# Patient Record
Sex: FEMALE | NOT HISPANIC OR LATINO | Employment: OTHER | RURAL
[De-identification: names, ages, dates, MRNs, and addresses within clinical notes are randomized per-mention and may not be internally consistent; named-entity substitution may affect disease eponyms.]

---

## 2018-03-28 ENCOUNTER — OFFICE VISIT CONVERTED (OUTPATIENT)
Dept: PULMONOLOGY | Facility: CLINIC | Age: 73
End: 2018-03-28
Attending: PHYSICIAN ASSISTANT

## 2018-05-17 ENCOUNTER — OFFICE VISIT CONVERTED (OUTPATIENT)
Dept: PULMONOLOGY | Facility: CLINIC | Age: 73
End: 2018-05-17
Attending: INTERNAL MEDICINE

## 2018-11-20 ENCOUNTER — OFFICE VISIT CONVERTED (OUTPATIENT)
Dept: PULMONOLOGY | Facility: CLINIC | Age: 73
End: 2018-11-20
Attending: INTERNAL MEDICINE

## 2020-04-15 ENCOUNTER — OFFICE VISIT CONVERTED (OUTPATIENT)
Dept: PULMONOLOGY | Facility: CLINIC | Age: 75
End: 2020-04-15
Attending: INTERNAL MEDICINE

## 2020-07-17 ENCOUNTER — OFFICE VISIT CONVERTED (OUTPATIENT)
Dept: PULMONOLOGY | Facility: CLINIC | Age: 75
End: 2020-07-17
Attending: INTERNAL MEDICINE

## 2021-01-20 ENCOUNTER — OFFICE VISIT CONVERTED (OUTPATIENT)
Dept: PULMONOLOGY | Facility: CLINIC | Age: 76
End: 2021-01-20
Attending: INTERNAL MEDICINE

## 2021-05-28 VITALS
HEIGHT: 62 IN | DIASTOLIC BLOOD PRESSURE: 64 MMHG | RESPIRATION RATE: 20 BRPM | HEART RATE: 94 BPM | SYSTOLIC BLOOD PRESSURE: 106 MMHG | WEIGHT: 207.19 LBS | OXYGEN SATURATION: 90 % | TEMPERATURE: 97.3 F | BODY MASS INDEX: 38.13 KG/M2

## 2021-05-28 VITALS
OXYGEN SATURATION: 90 % | TEMPERATURE: 98.3 F | HEIGHT: 62 IN | DIASTOLIC BLOOD PRESSURE: 64 MMHG | SYSTOLIC BLOOD PRESSURE: 144 MMHG | BODY MASS INDEX: 42.23 KG/M2 | RESPIRATION RATE: 16 BRPM | WEIGHT: 229.5 LBS | HEART RATE: 80 BPM

## 2021-05-28 VITALS
HEIGHT: 62 IN | OXYGEN SATURATION: 91 % | OXYGEN SATURATION: 94 % | DIASTOLIC BLOOD PRESSURE: 62 MMHG | RESPIRATION RATE: 14 BRPM | HEART RATE: 84 BPM | BODY MASS INDEX: 37.18 KG/M2 | WEIGHT: 221 LBS | BODY MASS INDEX: 40.67 KG/M2 | TEMPERATURE: 98 F | TEMPERATURE: 98.2 F | DIASTOLIC BLOOD PRESSURE: 77 MMHG | SYSTOLIC BLOOD PRESSURE: 121 MMHG | RESPIRATION RATE: 12 BRPM | WEIGHT: 202.06 LBS | SYSTOLIC BLOOD PRESSURE: 157 MMHG | HEIGHT: 62 IN | HEART RATE: 83 BPM

## 2021-05-28 NOTE — PROGRESS NOTES
Patient: SHAHNAZ SHAIKH     Acct: [id number]     Report: #[id number]  UNIT #: [id number]     : 1945    Encounter Date:04/15/2020  PRIMARY CARE: ELENA PIERSON  ***Signed***  --------------------------------------------------------------------------------------------------------------------  TELEHEALTH NOTE      History of Present Illness            Chief Complaint: soa follow up            Shahnaz Shaikh is presenting for evaluation via Telehealth visit. Verbal consent     obtained before beginning visit.            Provider spent 22 minutes with the patient during telehealth visit.            The following staff were present during the visit: carlota rose MD and beth yin ma            The patient is a 74 year old  female with a history of pulmonary     embolism and COPD here for follow up.  She is on lifelong anticoagulation and     has been doing relatively well.  She lives in [place], lives 60 miles     away and has trouble getting here for follow up.  She has COPD with FEV1 of 50%     and is only on albuterol as needed 3-4 times per day.  She reports dyspnea on     exertion, worse with walking 200-300 feet, moderate in severity, worse with     exertion and relieved with rest.  She also has increasing dry hacking cough     throughout the day with no sputum production or hemoptysis.  Denies any nausea,     vomiting, fevers, chills, headaches or chest pain. She is able to perform her     ADLs without difficulty.  Denies any swollen glands or lymph nodes of the head     and neck.  Of note, she was recently hospitalized in 2020 for pulmonary     embolism and this was submassive.  Echocardiogram did show right heart strain     and she is due for a repeat echocardiogram.  She denies any recent leg swelling,    orthopnea or paroxysmal nocturnal dyspnea.  She is able to perform her ADLs     without difficulty.  Denies any swollen glands or lymph nodes of the head and     neck.             I have personally reviewed the review of systems, past family, social, surgical     and medical histories and I agree with the findings.              Physical exam deferred due to TeleHealth visit.              I reviewed my last office  note.  I personally reviewed hospital records from     01/2020 including an echocardiogram that I personally reviewed 01/2020.  I     personally reviewed chest x-ray and chest CT from 01/2020.  Labs from 01/2020     personally reviewed showing 680 peripheral eosinophils and no evidence of     chronic hypercapnic respiratory failure.                           Plan/Instructions      Ambulatory Assessment/Plan:        Pulmonary hypertension - I27.20            Notes      New Medications      * Tiotropium Br/Olodaterol HCl (Stiolto Respimat Inhal [place]) 4 GM MIST.INHAL: 2      PUFFS INH QDAY #1      Renewed Medications      * Albuterol/Ipratropium (Duoneb) 3 ML AMPUL.NEB: 3 ML INH RTQ8H 30 Days #90         Instructions: DIAGNOSIS CODE REQUIRED PRIOR TO PRESCRIBING.         Dx: Respiratory failure with hypoxia - J96.91      New Diagnostics      * Echo Complete, Week         Dx: Pulmonary hypertension - I27.20      Plan/Instructions            * Plan Of Care: ()            * Chronic conditions reviewed and taken into consideration for today's treatment       plan.      * Patient instructed to seek medical attention urgently for new or worsening       symptoms.      * Patient was educated/instructed on their diagnosis, treatment and medications       prior to discharge from the clinic today.            IMPRESSION:      1. Acute pulmonary embolism with cor pulmonale, status post TPA, now resolved     and at baseline, now on lifelong anticoagulation.      2. Pulmonary hypertension, question of secondary to chronic thromboembolic     pulmonary hypertension versus acute pulmonary hypertension related to cor     pulmonale.  Will need follow up echocardiogram to reassess.      3. COPD  without exacerbation, FEV1 50% with significant bronchodilator response.      She is using albuterol frequently, COPD assessment test score is 20 signifying     poor control of underlying disease. Would benefit from LAMA/LABA therapy.      4. Chronic hypoxemic respiratory failure.      5.  Known morbid obesity with BMI over 40.      6. Multiple lung nodules stable in size for a year and a half.               PLAN:      1.  Given patient had recurrent PEs and the last one required extensive     hospitalization at [facility] and required half dose TPA with     persistent profound hypoxemia and right heart failure, will recommend likely     anticoagulation with Eliquis and she will continue for now.      2. Repeat echocardiogram now to assess for improving pulmonary hypertension.  T    he patient's symptoms are back to baseline and will need to further evaluate for     possible CTEPH.  If there is continued pulmonary hypertension noted on     echocardiogram, she will need a VQ scan to evaulate for chronic thromboembolic     pulmonary hypertension.      3.  Repeat noncontrast chest CT in one year to assess stable pulmonary nodules.      4. Start Stiolto two puffs daily.  Inhaler education discussed over the phone     today. Continue albuterol as needed.      5. Continue home 2 liters of oxygen to keep SPO2 greater than 90%.      6. Encourage ambulation.      7.  Up-to-date with flu, Prevnar and Pneumovax.      8.  The patient is going to follow up with Dr. Saucedo in [place] as the     patient lives in [place] and she has trouble getting transportation here     and it is 60 miles away. She is agreeable to seeing Dr. Saucedo in     [place].  She can follow up with her in the next 2-3 months.      9.  I spent 22 minutes of time today in a TeleHealth visit with this patient,     discussing the case with the patient over the phone and personally reviewing all     pertinent labs, imaging and  provider notes.      Codes:  Phone Eval 21-30 mi [id number]            Electronically signed by JEROME HOANG  05/12/2020 12:34       Disclaimer: Converted document may not contain table formatting or lab diagrams. Please see "[employer] System for the authenticated document.

## 2021-05-28 NOTE — PROGRESS NOTES
Patient: ALLAN CHRISTIE     Acct: [id number]     Report: #[id number]  UNIT #: [id number]     : 1945    Encounter Date:2020  PRIMARY CARE: ELENA PIERSON  ***Signed***  --------------------------------------------------------------------------------------------------------------------  Chief Complaint      Encounter Date      2020            Primary Care Provider      ELENA PIERSON            Referring Provider      ELENA PIERSON            Patient Complaint      Patient is complaining of      follow up/soa            VITALS      Height 5 ft 2 in / 157.48 cm      Weight 229 lbs 8 oz / 104.508763 kg      BSA 2.03 m2      BMI 42.0 kg/m2      Temperature 98.3 F / 36.83 C - Oral      Pulse 80      Respirations 16      Blood Pressure 144/64 Sitting, Right Arm      Pulse Oximetry 90%, nasal cannula, 2 lpm            HPI      The patient is a 74 year old female with a history of massive pulmonary embolism    with right heart strain requiring TPA. She is on Lifelong anticoagulation with     Eliquis.  She also has COPD with an FEV1 of about 50% of predicted. She uses     albuterol 3-4 times per day as needed.  She followed up with me today because of    her location.  She lives in [place] and was having difficulty making the     long trips to see Dr. Mondragon.  She reports doing well. The swelling in her legs    has dramatically decreased over the past several months. She is wearing her     supplemental oxygen 24/, denies chest pain or pressure, denies worsening cough     since starting Stiolto and thinks it is working quite well.  She does report     being afraid to run out of oxygen because she does not have a portable oxygen     concentrator and she wishes that the insurance company would pay for it because     she cannot even get out and go to the store, perform any exercise or go to     doctors appointments further away from her house because of fearful of running     out of  oxygen.              Past medical, family, social and surgical history reviewed and I agree with that    as documented in the medical chart.            ROS      Constitutional:  Complains of: Fatigue; Denies: Fever, Weight gain, Weight loss,    Chills, Insomnia, Other      Respiratory/Breathing:  Complains of: Shortness of air, Wheezing, Cough; Denies:    Hemoptysis, Pleuritic pain, Other      Endocrine:  Denies: Polydipsia, Polyuria, Heat/cold intolerance, Abnorml     menstrual pattern, Diabetes, Other      Eyes:  Denies: Blurred vision, Vision Changes, Other      Ears, nose, mouth, throat:  Denies: Mouth lesions, Thrush, Throat pain,     Hoarseness, Allergies/Hay Fever, Post Nasal Drip, Headaches, Recent Head Injury,    Nose Bleeding, Neck Stiffness, Thyroid Mass, Hearing Loss, Ear Fullness, Dry     Mouth, Nasal or Sinus Pain, Dry Lips, Nasal discharge, Nasal congestion, Other      Cardiovascular:  Complains of: Leg Swelling, Dyspnea on Exertion; Denies:     Palpitations, Syncope, Claudication, Chest Pain, Wake up Gasping for air,     Irregular Heart Rate, Cyanosis, Other      Gastrointestinal:  Denies: Nausea, Constipation, Diarrhea, Abdominal pain,     Vomiting, Difficulty Swallowing, Reflux/Heartburn, Dysphagia, Jaundice,     Bloating, Melena, Bloody stools, Other      Genitourinary:  Denies: Urinary frequency, Incontinence, Hematuria, Urgency,     Nocturia, Dysuria, Other      Musculoskeletal:  Denies: Joint Pain, Joint Stiffness, Joint Swelling, Myalgias,    Other      Hematologic/lymphatic:  DENIES: Lymphadenopathy, Bruising, Bleeding tendencies,     Other      Neurological:  Denies: Headache, Numbness, Weakness, Seizures, Other      Psychiatric:  Denies: Anxiety, Appropriate Effect, Depression, Other      Sleep:  No: Excessive daytime sleep, Morning Headache?, Snoring, Insomnia?, Stop    breathing at sleep?, Other      Integumentary:  Denies: Rash, Dry skin, Skin Warm to Touch, Other       Immunologic/Allergic:  Denies: Latex allergy, Seasonal allergies, Asthma,     Urticaria, Eczema, Other      Immunization status:  Up to date            FAMILY/SOCIAL/MEDICAL HX      Surgical History:  No: AAA Repair, Abdominal Surgery, Adenoids, Angioplasty,     Appendectomy, Back Surgery, Bladder Surgery, Bowel Surgery, Breast Surgery,     CABG, Carotid Stenosis, Cholecystectomy, Ear Surgery, Eye Surgery, Head Surgery,    Hernia Surgery, Kidney Surgery, Nose Surgery, Oral Surgery, Orthopedic Surgery,     Prostatectomy, Rectal Surgery, Spinal Surgery, Testicular Surgery, Throat     Surgery, Tonsils, Valve Replacement, Vascular Surgery, Other Surgeries      Stroke - Family Hx:  Child      Heart - Family Hx:  Brother, Child      Is Father Still Living?:  No      Is Mother Still Living?:  No       Family History:  Yes      Social History:  No Tobacco Use, No Alcohol Use, No Recreational Drug use      Smoking status:  Former smoker (2.5 ppd x 50 years/ quit Jan 2003)      Anticoagulation Therapy:  Yes      Antibiotic Prophylaxis:  No      Medical History:  Yes: Asthma, Chronic Bronchitis/COPD, Congestive Heart Failu,     Diabetes (Type II), Hemorrhoids/Rectal Prob; No: Alcoholism, Allergies, Anemia,     Arthritis, Atrial Fibrillation, Blood Disease, Broken Bones, Cataracts, Chemical    Dependency, Chemotherapy/Cancer, Emphysema, Chronic Liver Disease, Colon     Trouble, Colitis, Diverticulitis, Deafness or Ringing Ears, Convulsions,     Depression, Anxiety, Bipolar Disorder, PTSD, Epilepsy, Seizures, Forgetfullness,    Glaucoma, Gall Stones, Gout, Head Injury, Heart Attack, Heart Murmur, GERD,     Hepatitis, Hiatal Hernia, High Blood Pressure, High Cholesterol, HIV (Do not ask    - volu, Jaundice, Kidney or Bladder Disease, Kidney Stones, Migrane Headaches,     Mitral Valve Prolapse, Night sweats, Phlebitis, Psychiatric Care, Reflux     Disease, Rheumatic Fever, Sexually Transmitted Dis, Shortness Of Breath, Sinus      Trouble, Skin Disease/Psoriais/Ecz, Stroke, Thyroid Problem, Tuberculosis or Pos    TB Te, Miscellaneous Medical/oth      Psychiatric History      none            PREVENTION      Hx Influenza Vaccination:  No      Influenza Vaccine Declined:  Yes      2 or More Falls in Past Year?:  No      Fall Past Year with Injury?:  No      Hx Pneumococcal Vaccination:  Yes      Encouraged to follow-up with:  PCP regarding preventative exams.      Chart initiated by      beth yin ma            ALLERGIES/MEDICATIONS      Allergies:        Coded Allergies:             NO KNOWN ALLERGIES (Unverified , 7/17/20)      Medications    Last Reconciled on 7/17/20 13:47 by CLAUDIA COULTER,       Albuterol/Ipratropium (Duoneb) 3 Ml Ampul.neb      3 ML INH RTQ8H for 30 Days, #90 NEB 3 Refills         Prov: JEROME HOANG         5/7/20       Tiotropium Br/Olodaterol HCl (Stiolto Respimat Inhal Spray) 4 Gm Mist.inhal      2 PUFFS INH QDAY, #1 INH 3 Refills         Prov: JEROEM HOANG         4/15/20       Apixaban (Eliquis) 5 Mg Tablet      5 MG PO BID for 30 Days, #75 TAB         Prov: Kirk Urias         1/16/20       Potassium Chloride (K-Dur*) 20 Meq Tab.er.prt      20 MEQ PO QDAY, #30 TAB 0 Refills         Reported         1/12/20       Ergocalciferol (Ergocalciferol) 50,000 Unit Capsule      74601 UNITS PO MO@09, #4 CAP 0 Refills         Reported         1/12/20       Gabapentin (Neurontin) 300 Mg Capsule      300 MG PO TID, #90 CAP 0 Refills         Reported         11/20/18       Insulin Detemir (Levemir VIAL*) 100 Unit/1 Ml Vial      6 UNITS SUBQ BID for 30 Days, VIAL         Prov: Napoleon Ariza         3/12/18       Famotidine (Famotidine) 20 Mg Tablet      20 MG PO BID for 30 Days, #60 TAB         Prov: Napoleon Ariza         3/12/18       Furosemide (Furosemide) 20 Mg Tablet      20 MG PO QDAY for 30 Days, #30 TAB         Prov: Napoleon Ariza         3/12/18      Current Medications      Current Medications Reviewed 7/17/20             EXAM      VITAL SIGNS:  Reviewed.        GENERAL: Morbid obese female on 2 liters of oxygen, in no acute distress.        NECK:  Supple without tracheal deviation or lymphadenopathy.  No thyromegaly     appreciated.      LYMPHATICS:  No cervical or supraclavicular lymphadenopathy.      HEENT: Increased neck diameter, no JVD appreciated.        RESPIRATORY:  Diminished breath sounds at the bases, but otherwise clear to     auscultation without wheezes, rales or rhonchi.  Tympanic to percussion.        CARDIOVASCULAR:  Distant heart tones secondary to body habitus, unable to feel     for PMI, no obvious murmurs or gallops, trace pedal edema bilaterally.        GI: Obese, soft, nontender, nondistended, no organomegaly.  Bowel sounds present    in all four quadrants.      MUSCULOSKELETAL:  No joint effusions, erythema or warmth over the major joint     systems.      SKIN:  No rashes or lesions.      NEUROLOGIC: Cranial nerves II-XII are intact bilaterally.  Moves all ext    remities. Ambulates with ease.      PSYCH:  Appropriate mood and affect.      Vtials      Vitals:             Height 5 ft 2 in / 157.48 cm           Weight 229 lbs 8 oz / 104.104777 kg           BSA 2.03 m2           BMI 42.0 kg/m2           Temperature 98.3 F / 36.83 C - Oral           Pulse 80           Respirations 16           Blood Pressure 144/64 Sitting, Right Arm           Pulse Oximetry 90%, nasal cannula, 2 lpm            REVIEW      Results Reviewed      PCCS Results Reviewed?:  Yes Prev Lab Results, Yes Prev Radiology Results, Yes     [facility]      Lab Results      I reviewed Dr. Mondragon last office visit note.            I reviewed her most recent echocardiogram. This was performed at [facility] showing normal right and level ventricles with normal EF, structurally     normal appearing valves without stenosis or regurgitation.  Right ventricular     systolic pressure was around 30.            Assessment       IMPRESSION:      1.  History of massive PE requiring TPA, on lifelong anticoagulation with     Eliquis.      2.  Chronic hypoxic respiratory failure on 2 liters of oxygen at all times.      3.  Morbid obesity with BMI of 42.      4. Moderately severe COPD without acute exacerbation.      5. History of a new ground glass nodule, unfortunately, imaging not available     for us to review from outside hospital.      6. History of multiple lung nodules, stable.              PLAN:      1.  I reviewed the EKG with the patient.  Given her massive PE, these pulmonary     pressures are acceptable.  I do not think she needs to be referred for any right    or left heart cath.  She does not need further workup at this time.      2. I do feel that the patient would benefit from having a portable oxygen     concentrator/[employer] machine. She is very limited in her mobility and ability to     stay active because of the heavy tanks, also they run out very quickly and [place] a two hour drive to     the nearest civilization. She cannot get out of her house because she does not     have a portable oxygen concentrator. Please provide this for her.      3. She is due for her noncontrast chest CT within the next six months. If it has    not been ordered by Dr. Mondragon, I will make sure it is ordered at her follow up    appointment.      4. Up-to-date with flu, Prevnar and Pneumovax.      5. Counseled the patient on weight loss, continuing to watch her calories as she    is morbidly obese.      6. Continue Stiolto.            Patient Education      ACO BMI High above 25:  Counseling Given, Encouraged weight loss      Education resources provided:  Yes      Other Education:  CHRONIC ELIQUIS THERAPY EDUCATION            Electronically signed by CLAUDIA COULTER  07/23/2020 09:07       Disclaimer: Converted document may not contain table formatting or lab diagrams. Please see [email or web address] for the authenticated document.

## 2021-05-28 NOTE — PROGRESS NOTES
Patient: ALLAN CHRISTIE     Acct: [id number]     Report: #[id number]  UNIT #: [id number]     : 1945    Encounter Date:2018  PRIMARY CARE: ELENA PIERSON  ***Signed***  --------------------------------------------------------------------------------------------------------------------  Chief Complaint      Encounter Date      Mar 28, 2018            Primary Care Provider      HARISH BENNETT            Patient Complaint      Patient is complaining of      [facility] D/C ...New patient here for respiratory failure            VITALS      Height 5 ft 2 in / 157.48 cm      Weight 207 lbs 3 oz / 93.161966 kg      BSA 2.08 m2      BMI 37.9 kg/m2      Temperature 97.3 F / 36.28 C - Oral      Pulse 94      Respirations 20      Blood Pressure 106/64 Sitting, Left Arm      Pulse Oximetry 90%, nasal cannula, 1 lpm            HPI      The patient is a very pleasant 72 year old female who was hospitalized at     [facility] with a lengthy intensive care unit stay for acute     hypoxic and hypercapnic respiratory failure requiring mechanical ventilation,     influenza pneumonia, Stenotrophomonas pneumonia, bilateral pulmonary emboli,     pulmonary edema, acute diastolic congestive heart failure and right sided     pneumothorax from mechanical ventilation requiring chest tube placement. She     was found to have chronic obstructive pulmonary disease with a history of heavy     tobacco abuse, greater than 100 pack year history and quit 15 years ago. She     also had hyponatremia as well as acute kidney injury which were resolving by     her time of discharge. She was discharged to a rehab facility in [place]     where she is currently still residing and her family says she will be     discharged from there in about 5 days. She reports that she is overall doing     better and gradually improving. She is working with physical therapy at the     rehab, ambulating and doing some exercises, and states that  she is now able to     ambulate by herself to and from the restroom and does some walking down the     hallways without any significant dyspnea.  She denies any coughing or wheezing.     She denies hemoptysis, fevers or chills, nausea and vomiting. She was     discharged on DuoNeb which she is using q 8 hours and feels they are helping     her some. She did not know she had chronic obstructive pulmonary disease or     asthma but she reports that she had severe dyspnea on exertion for months if     not years, even before her presentation to the hospital. She is now on eliquis     5 mg PO twice daily for her bilateral pulmonary emboli.  This was her first     pulmonary embolism or deep venous thrombosis she ever had. She was started on     Lasix 20 mg PO daily which she reports has been increased to PO twice daily at     her rehab facility. However she is developing worsening lower extremity edema.     She does currently deny orthopnea or chest tightness.             I have reviewed his Review of Systems medical, surgical and family history and     agree with those as entered.            ROS      Constitutional:  Denies: Fatigue, Fever, Weight gain, Weight loss, Chills,     Insomnia, Other      Respiratory/Breathing:  Complains of: Shortness of air, Denies: Wheezing, Cough    , Hemoptysis, Pleuritic pain, Other      Endocrine:  Denies: Polydipsia, Polyuria, Heat/cold intolerance, Abnorml     menstrual pattern, Diabetes, Other      Eyes:  Denies: Blurred vision, Vision Changes, Other      Ears, nose, mouth, throat:  Denies: Mouth lesions, Thrush, Throat pain,     Hoarseness, Allergies/Hay Fever, Post Nasal Drip, Headaches, Recent Head Injury    , Nose Bleeding, Neck Stiffness, Thyroid Mass, Hearing Loss, Ear Fullness, Dry     Mouth, Nasal or Sinus Pain, Dry Lips, Nasal discharge, Nasal congestion, Other      Cardiovascular:  Denies: Palpitations, Syncope, Claudication, Chest Pain, Wake     up Gasping for air, Leg  Swelling, Irregular Heart Rate, Cyanosis, Dyspnea on     Exertion, Other      Gastrointestinal:  Denies: Nausea, Constipation, Diarrhea, Abdominal pain,     Vomiting, Difficulty Swallowing, Reflux/Heartburn, Dysphagia, Jaundice, Bloating    , Melena, Bloody stools, Other      Genitourinary:  Denies: Urinary frequency, Incontinence, Hematuria, Urgency,     Nocturia, Dysuria, Testicular problems, Other      Musculoskeletal:  Denies: Joint Pain, Joint Stiffness, Joint Swelling, Myalgias    , Other      Hematologic/lymphatic:  DENIES: Lymphadenopathy, Bruising, Bleeding tendencies,     Other      Neurological:  Denies: Headache, Numbness, Weakness, Seizures, Other      Psychiatric:  Denies: Anxiety, Appropriate Effect, Depression, Other      Sleep:  No: Excessive daytime sleep, Morning Headache?, Snoring, Insomnia?,     Stop breathing at sleep?, Other      Integumentary:  Denies: Rash, Dry skin, Skin Warm to Touch, Other      Immunologic/Allergic:  Denies: Latex allergy, Seasonal allergies, Asthma,     Urticaria, Eczema, Other      Immunization status:  No: Up to date            FAMILY/SOCIAL/MEDICAL HX      Stroke - Family Hx:  Child      Heart - Family Hx:  Brother, Child      Is Father Still Living?:  No      Is Mother Still Living?:  No      Smoking status:  Former smoker (2.5 ppd x 50 years/ quit Jan 2003)      Anticoagulation Therapy:  Yes      Antibiotic Prophylaxis:  No      Medical History:  Yes: Asthma, Chronic Bronchitis/COPD, Congestive Heart Failu,     Diabetes, No: Arthritis, Blood Disease, Chemotherapy/Cancer, Seizures      Psychiatric History      none            Hx Influenza Vaccination:  No      Influenza Vaccine Declined:  No      2 or More Falls Past Year?:  No      Fall Past Year with Injury?:  No      Hx Pneumococcal Vaccination:  No      Encouraged to follow-up with:  PCP regarding preventative exams.      Chart initiated by      Antonia Quiñones MA            ALLERGIES/MEDICATIONS      Allergies:         Coded Allergies:             NO KNOWN ALLERGIES (Unverified , 3/28/18)      Medications      Glycopyrrolate/Formoterol Fum (Bevespi Aerosphere Inhaler) 10.7 Gm Hfa.aer.ad      2 PUFFS INH BID, #1 HFA.AER.AD 4 Refills         Prov: Beatrice Alberts PA-C         3/28/18       Apixaban (Eliquis) 5 Mg Tablet      5 MG PO BID for 30 Days, #60 TAB         Prov: Napoleon Ariza         3/12/18       Psyllium Seed (With Sugar) (Metamucil) 575 Gm Powder      2 TBSP PO QDAY for 30 Days, #1 BOTTLE         Prov: Napoleon Ariza         3/12/18       Insulin Lispro (HumaLOG VIAL*) 100 Units/Ml Vial      0 UNITS SUBQ QID INSULIN for 30 Days, #1 VIAL         Prov: Napoleon Ariza         3/12/18       Insulin Detemir (Levemir VIAL*) 100 Unit/1 Ml Vial      6 UNITS SUBQ BID for 30 Days, VIAL         Prov: Annita,Napoleon         3/12/18       (Miconazole 2% Pwd) 30 APL/BOTTLE POWD No Conflict Check      1 APL TOPICAL BID for 30 Days, #1 BOTTLE         Prov: AnnitaNapoleon         3/12/18       Famotidine (Famotidine) 20 Mg Tablet      20 MG PO BID for 30 Days, #60 TAB         Prov: Annita,Napoleon         3/12/18       Furosemide (Furosemide) 20 Mg Tablet      20 MG PO QDAY for 30 Days, #30 TAB         Prov: Annita,Napoleon         3/12/18       Albuterol/Ipratropium (Duoneb) 3 Ml Ampul.neb      3 ML INH RTQ8H for 30 Days, #90 NEB         Prov: Napoleon Ariza         3/12/18      Current Medications      Current Medications Reviewed 3/28/18            EXAM      CONSTITUTIONAL: Pleasant female in no acute distress,  normal conversant.       EYES : Pink conjunctive, no ptosis, PERRL.       ENMT : Nose and ears appear normal, normal dentition, mild posterior pharyngeal     wall erythema. Mallampati classification       Neck: Nontender, no masses, no thyromegaly, no nodules.      Resp : Mildly decreased breath sounds throughout without wheezing, crackles or     rhonchi. Normal work of breathing noted.       CVS  : No carotid bruits, s1s2 nl, RRR, no murmur, rubs or gallop,   +2-3     pitting edema up to knees bilaterally.       Chest wall: Normal rise with inspiration, nontender on palpation      GI   : Abdomen soft, with no masses, no hepatosplenomegaly, no hernias, BS+      MSK  : Normal gait and station, no digital cyanosis or clubbing       Skin : No rashes, ulcerations or lesions, normal turgor and temperature      Neuro: CN II - XII intact, no sensory deficits, DTRs intact and symmetrical, no     motor weakness      Psych: Appropriate affect, A   Vtials      Vitals:             Height 5 ft 2 in / 157.48 cm           Weight 207 lbs 3 oz / 93.553834 kg           BSA 2.08 m2           BMI 37.9 kg/m2           Temperature 97.3 F / 36.28 C - Oral           Pulse 94           Respirations 20           Blood Pressure 106/64 Sitting, Left Arm           Pulse Oximetry 90%, nasal cannula, 1 lpm            REVIEW      Results Reviewed      PCCS Results Reviewed?:  Yes Prev Lab Results, Yes Prev Radiology Results, Yes     Previous [facility] Records (I personally reviewed the patient's previous provider     notes including progress notes, pulmonary consultation, discharge summary,     laboratory work and imaging. )      Radiographic Results               [facility]                PACS RADIOLOGY REPORT            Patient: ALLAN CHRISTIE   Acct: #[id number]   Report: #[id number]            UNIT #: [id number]    DOS: 18      INSURANCE:MEDICARE PART A   LOCATION:[facility]  0109-   : 1945            PROVIDERS      ADMITTING:  Kirk Urias   ATTENDING: Kirk Urias      FAMILY:  OTHER PHYSICIAN   ORDERING:  CLAUDIA COULTER         OTHER:    DICTATING:  CHELO STEVENS MD            REQ #:[id number]   EXAM:W - CT CHEST with CONTRAST      REASON FOR EXAM:  Rule out PE      REASON FOR VISIT:  RESPIRATORY FAILURE            *******Signed******         PROCEDURE:   CT CHEST W/ CONTRAST             COMPARISON:   [facility],  CR, CHEST AP/PA 1 VIEW, 2/16/2018, 4:19.             INDICATIONS:   Respiratory Failure, Rule out PE             TECHNIQUE:   After obtaining the patient's consent, CT images were obtained     with non-ionic       intravenous contrast material.               PROTOCOL:     Pulmonary embolism imaging protocol performed                RADIATION:     DLP: 1003 mGy*cm          Automated exposure control was utilized to minimize radiation dose.       CONTRAST:   100 cc Isovue 370 I.V.      LABS:     eGFR: 39 ml/min/1.73m2      NOTES:                  FINDINGS:         The exam is limited by body habitus and respiratory motion.  There are     significant areas of       pulmonary emboli identified within both lower lobes, left greater than right.      There is an       near-complete atelectasis of the right lower lobe with patchy areas of     diminished enhancement       within the right lower lobe, which could reflect areas of pulmonary infarction     the or areas of       focal pneumonia.  There is is patchy airspace disease within the right middle     lobe and right upper       lobe, as well.  There is moderate emphysema.  There is a moderate right pleural     effusion with small       left pleural effusion and there is mild left basilar airspace disease.  There     is a small a moderate       right-sided pneumothorax without suggestion of a tension pneumothorax.  The     heart is enlarged.        There are no suspicious lung nodules.  The aorta and great vessels appear     within normal limits.        The patient is intubated with appropriate ET tube tip position.  A gastric     suction tube courses       below the diaphragm and terminates in the stomach.  There is no significant     mediastinal or hilar       adenopathy.  The trachea and the esophagus appear unremarkable.  The thyroid is     heterogeneous.             Subcutaneous fat and underlying musculature appear within normal limits.  The     liver appears low        attenuation, heterogeneous and enlarged.  The spleen is also enlarged measuring     up to 14 cm       diameter.  There is a small volume of perihepatic and perisplenic ascites.      There is low       attenuation thickening of the lesser [place] and favored to reflect changes of     adenoma or       hyperplasia.  There are no acute osseous abnormalities or destructive bone     lesions.  There is mild       multilevel thoracic disc degeneration.             CONCLUSION:         1. There is a significant amount of pulmonary emboli identified within both     lower lobes.  There is       some enlargement of the right ventricle, which could indicate increased right     heart strain.      2. There is near-complete atelectasis of the right lower lobe primarily due to     a moderate       right-sided pleural effusion.  There are areas of  diminished enhancement in     the atelectatic right       lower lobe suggestive of either pulmonary infarction or patchy areas of focal     pneumonia.      3. There is patchy airspace disease noted within both  lungs consistent     pneumonia.      4. Moderate right-sided pneumothorax.      5. This is most likely unchanged in size from chest x-ray from this morning.      6. Moderate right and small left pleural effusions.      7. Enlarged heterogeneous liver, mild enlargement of the spleen and small     volume ascites.        Correlate for the possibility of cirrhosis with portal venous hypertension.      8. Cardiomegaly.              CHELO STEVENS MD             Electronically Signed and Approved By: CHELO STEVENS MD on 2/16/2018 at 23:26                            Until signed, this is an unconfirmed preliminary report that may contain      errors and is subject to change.                                              ALTD1:      D:02/16/18 2326                     [facility]                PACS RADIOLOGY REPORT             Patient: ALLAN CHRISTIE   Acct: #[id number]   Report: #[id number]            UNIT #: [id number]    DOS: 18 0511      INSURANCE:MEDICARE PART A   LOCATION:[facility]  0212-01   : 1945            PROVIDERS      ADMITTING:  Slava Girard   ATTENDING: Slava Girard      FAMILY:  OTHER PHYSICIAN   ORDERING:  MATILDE BLOOD         OTHER:    DICTATING:  Mindi Dominguez MD, JR            REQ #:[id number]   EXAM:CXRPORTABL - Portable Chest xray 1 view      REASON FOR EXAM:  follow up pneumonia      REASON FOR VISIT:  RESPIRATORY FAILURE            *******Signed******         PROCEDURE:   PORTABLE CHEST X-RAY             COMPARISON:   [facility], , CXR PORTABLE, 2018, 14:11.             INDICATIONS:   IMAGING FOLLOW-UP OF PNEUMONIA.             FINDINGS:      A single semiupright portable chest x-ray was performed.      Similar to slightly increased       bilateral infiltrates are suspected, which may represent mild pulmonary edema     with vascular       congestion.  There is borderline cardiac enlargement.  There is slightly     greater rightward       rotational artifact.  A tiny right pleural effusion cannot be excluded.      External artifact obscures       detail.  There is old calcified granulomatous disease of the chest.  No     pneumothorax is seen.        Biapical pleural-parenchymal scarring is suspected.             CONCLUSION:      There may be slightly increased bilateral infiltrates since     the prior exam.             MINDI DOMINGUEZ JR, MD             Electronically Signed and Approved By: MINDI DOMINGUEZ JR, MD on 3/04/2018 at 6:    05                        Until signed, this is an unconfirmed preliminary report that may contain      errors and is subject to change.                                              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            Notes      New Medications      * Glycopyrrolate/Formoterol Fum (Bevespi Aerosphere Inhaler) 10.7 GM HFA.AER.AD    : 2 PUFFS INH BID #1       Dx: Acute respiratory failure with hypoxia and hypercapnia - J96.01, J96.02      New Diagnostics      * 6 Min Walk With Pulse Ox, 6 WEEKS       Dx: Acute respiratory failure with hypoxia and hypercapnia - J96.01, J96.02      * PFT-Comp, PrePost,DLCO,BodyBox, 6 WEEKS       Dx: Acute respiratory failure with hypoxia and hypercapnia - J96.01, J96.02      * Chest W/ Cont CT, 6 WEEKS       Dx: Acute respiratory failure with hypoxia and hypercapnia - J96.01, J96.02      * BMP, Week       Dx: Acute respiratory failure with hypoxia and hypercapnia - J96.01, J96.02      * BMP, 6 WEEKS       Dx: Acute respiratory failure with hypoxia and hypercapnia - J96.01, J96.02      ASSESSMENT:      1. Acute hypoxic and hypercapnic respiratory failure resolving.       2. Acute diastolic congestive heart failure.       3. Chronic obstructive pulmonary disease without acute exacerbation.       4. Former very heavy tobacco abuse of cigarettes now in remission.       5. Stenotrophomonas pneumonia resolved.       6. Bilateral pulmonary emboli on eliquis.       7. Morbid obesity with BMI 37.9.       8. Influenza pneumonia resolved.       9. Right sided pneumothorax secondary to mechanical ventilation status post     chest tube placement and removal now resolved.       10. Acute kidney injury resolving.       11. Hyponatremia resolved.             PLAN:      1. At this time I will start the patient on Bevespi two puffs twice daily for     chronic obstructive pulmonary disease and have her continue DuoNeb as needed     rather than  scheduled. She does have a ProAir rescue inhaler at home and I have     instructed her to use that as needed for increased coughing, wheezing or     shortness of breath.       2. She appears to be having increased volume overload from her diastolic     congestive heart failure and is only taking Lasix 20 mg PO twice daily at this     time. I will increase it to 40 mg PO twice daily for the next week and have her     check a basic metabolic panel in 1 week to follow her renal function and     electrolytes.       3. I will check pulmonary function tests and a six minute walk test in 6 weeks     and repeat a chest CT scan with and without contrast in 6 weeks to follow up     her pulmonary emboli as well as her right sided pleural effusion and     infiltrates from pneumonia for resolution.       4. We will address her vaccination status and check an alpha-1 antitrypsin at     her next office visit.       5. I will have her follow up in 2 months, sooner if needed.            Patient Education      Patient Education Provided:  COPD, How to use an Inhaler, How to use a Nebulizer      Time Spent:  > 50% /[employer]            Patient Education:        Chronic Obstructive Pulmonary Disease                 Disclaimer: Converted document may not contain table formatting or lab diagrams. Please see [email or web address] for the authenticated document.

## 2021-05-28 NOTE — PROGRESS NOTES
Patient: ALLAN CHRISTIE     Acct: [id number]     Report: #[id number]  UNIT #: [id number]     : 1945    Encounter Date:2018  PRIMARY CARE: ELENA PIERSON  ***Signed***  --------------------------------------------------------------------------------------------------------------------  Chief Complaint      Encounter Date      May 17, 2018            Primary Care Provider      ELENA PIERSON            Referring Provider      ELENA PIERSON            Patient Complaint      Patient is complaining of      f/u PE/ COPD            VITALS      Height 5 ft 2 in / 157.48 cm      Weight 202 lbs 1 oz / 91.864974 kg      BSA 2.05 m2      BMI 37.0 kg/m2      Temperature 98.2 F / 36.78 C - Oral      Pulse 83      Respirations 12      Blood Pressure 121/77 Sitting, Left Arm      Pulse Oximetry 91%, ROOMAIR            HPI      The patient is a very pleasant 72 year old  female with a prolonged     hospitalization earlier in the year with PEs, respiratory failure, pneumonia     and pneumothorax on a ventilator now here for follow up.  Since last visit she     had a CT done showing almost complete resolution of infiltrations, effusion     resolved and pneumothorax resolved. Does have tiny nodules and needs repeat CAT     scan.  Labs are unremarkable and diuretics were adjusted.  Since last visit,     the patient did have PFTs done showing moderately severe airflow obstruction     with an FEV1 of 50% with significant bronchodilator response.  Her PE is also     resolved on CAT scan.  She is on Eliquis and has three months remaining and is     on bevespi.  She has been doing great.  She has occasional dyspnea on exertion     walking 8-900 feet, worse with exertion and relieved with rest.  She has no     associated cough or wheezing.  Dyspnea is mild with no associated chest pain or     hemoptysis.  She denies any nausea, vomiting, fevers, chills or headaches.  She     quit smoking prior to her  hospitalization and has not had a cigarette in five     months.  She is able to perform her ADLs without difficulty. Denies any swollen     glands or lymph nodes of the head and neck.            I have personally reviewed the review of systems, past family, social, surgical     and medical histories and I agree with the findings.            ROS      Constitutional:  Denies: Fatigue, Fever, Weight gain, Weight loss, Chills,     Insomnia, Other      Respiratory/Breathing:  Denies: Shortness of air, Wheezing, Cough, Hemoptysis,     Pleuritic pain, Other      Endocrine:  Denies: Polydipsia, Polyuria, Heat/cold intolerance, Abnorml     menstrual pattern, Diabetes, Other      Eyes:  Denies: Blurred vision, Vision Changes, Other      Ears, nose, mouth, throat:  Denies: Mouth lesions, Thrush, Throat pain,     Hoarseness, Allergies/Hay Fever, Post Nasal Drip, Headaches, Recent Head Injury    , Nose Bleeding, Neck Stiffness, Thyroid Mass, Hearing Loss, Ear Fullness, Dry     Mouth, Nasal or Sinus Pain, Dry Lips, Nasal discharge, Nasal congestion, Other      Cardiovascular:  Denies: Palpitations, Syncope, Claudication, Chest Pain, Wake     up Gasping for air, Leg Swelling, Irregular Heart Rate, Cyanosis, Dyspnea on     Exertion, Other      Gastrointestinal:  Denies: Nausea, Constipation, Diarrhea, Abdominal pain,     Vomiting, Difficulty Swallowing, Reflux/Heartburn, Dysphagia, Jaundice, Bloating    , Melena, Bloody stools, Other      Genitourinary:  Denies: Urinary frequency, Incontinence, Hematuria, Urgency,     Nocturia, Dysuria, Testicular problems, Other      Musculoskeletal:  Denies: Joint Pain, Joint Stiffness, Joint Swelling, Myalgias    , Other      Hematologic/lymphatic:  DENIES: Lymphadenopathy, Bruising, Bleeding tendencies,     Other      Neurological:  Denies: Headache, Numbness, Weakness, Seizures, Other      Psychiatric:  Denies: Anxiety, Appropriate Effect, Depression, Other      Sleep:  No: Excessive daytime  sleep, Morning Headache?, Snoring, Insomnia?,     Stop breathing at sleep?, Other      Integumentary:  Denies: Rash, Dry skin, Skin Warm to Touch, Other      Immunologic/Allergic:  Denies: Latex allergy, Seasonal allergies, Asthma,     Urticaria, Eczema, Other      Immunization status:  No: Up to date            FAMILY/SOCIAL/MEDICAL HX      Stroke - Family Hx:  Child      Heart - Family Hx:  Brother, Child      Is Father Still Living?:  No      Is Mother Still Living?:  No       Family History:  Yes      Social History:  No Tobacco Use, No Alcohol Use, No Recreational Drug use      Smoking status:  Former smoker (2.5 ppd x 50 years/ quit Jan 2003)      Anticoagulation Therapy:  Yes      Antibiotic Prophylaxis:  No      Medical History:  Yes: Asthma, Chronic Bronchitis/COPD, Congestive Heart Failu,     Diabetes, No: Arthritis, Blood Disease, Chemotherapy/Cancer, Seizures, Sinus     Trouble      Psychiatric History      NONE            PREVENTION      Hx Influenza Vaccination:  No      Influenza Vaccine Declined:  No      2 or More Falls Past Year?:  No      Fall Past Year with Injury?:  No      Hx Pneumococcal Vaccination:  No      Encouraged to follow-up with:  PCP regarding preventative exams.      Chart initiated by      MARIA ESTHER/ MA            ALLERGIES/MEDICATIONS      Allergies:        Coded Allergies:             NO KNOWN ALLERGIES (Unverified , 5/17/18)      Medications    Last Reconciled on 5/17/18 11:52 by JEROME HOANG MD      Glycopyrrolate/Formoterol Fum (Bevespi Aerosphere Inhaler) 10.7 Gm Hfa.aer.ad      2 PUFFS INH BID, #1 HFA.AER.AD 4 Refills         Prov: Beatrice Alberts PA-C         3/28/18       Apixaban (Eliquis) 5 Mg Tablet      5 MG PO BID for 30 Days, #60 TAB         Prov: Napoleon Ariza         3/12/18       Insulin Detemir (Levemir VIAL*) 100 Unit/1 Ml Vial      6 UNITS SUBQ BID for 30 Days, VIAL         Prov: Napoleon Ariza         3/12/18       Famotidine (Famotidine) 20 Mg Tablet      20 MG  PO BID for 30 Days, #60 TAB         Prov: Napoleon Ariza         3/12/18       Furosemide (Furosemide) 20 Mg Tablet      20 MG PO QDAY for 30 Days, #30 TAB         Prov: Napoleon Ariza         3/12/18       Albuterol/Ipratropium (Duoneb) 3 Ml Ampul.neb      3 ML INH RTQ8H for 30 Days, #90 NEB         Prov: Napoleon Ariza         3/12/18      Current Medications      Current Medications Reviewed 5/17/18            EXAM      Vital Signs Reviewed.      General:  WDWN, Alert, NAD.      HEENT: PERRL, EOMI.  OP, nares clear, no sinus tenderness.      Chest: Good aeration, clear to auscultation bilaterally, tympanic to percussion     bilaterally, no work of breathing noted.      CV: RRR, no MGR, pulses 2+, equal.        Abd: Obese, soft, NT, ND, +BS, no HSM.      EXT: No clubbing, no cyanosis, no edema, no joint tenderness.        Neuro:  A  Skin: No rashes or lesions.      Vtials      Vitals:             Height 5 ft 2 in / 157.48 cm           Weight 202 lbs 1 oz / 91.209494 kg           BSA 2.05 m2           BMI 37.0 kg/m2           Temperature 98.2 F / 36.78 C - Oral           Pulse 83           Respirations 12           Blood Pressure 121/77 Sitting, Left Arm           Pulse Oximetry 91%, ROOMAIR            REVIEW      Results Reviewed      PCCS Results Reviewed?:  Yes Prev Lab Results, Yes Prev Radiology Results, Yes     Previous [facility] Records      Lab Results      I reviewed last office note from BELLA Sutton.  I reviewed the patient's     PFTs showing moderately severe airflow obstruction with an FEV1 of 50% with     significant bronchodilator response. I reviewed alpha 1 antitrypsin testing     with normal genotype MM.  I also reviewed labs showing no peripheral     eosinophilia and no evidence of chronic hypercapnic respiratory failure.      Radiographic Results               [facility]                PACS RADIOLOGY REPORT            Patient: ALLAN CHRISTIE   Acct:  #[id number]   Report: #[id number]            UNIT #: [id number]    DOS: 05/10/18 1043      INSURANCE:[employer]   ORDER #:[id number]      LOCATION:[facility]     : 1945            PROVIDERS      ADMITTING:     ATTENDING: Beatrice Alberts PA-C      FAMILY:  OTHER PHYSICIAN   ORDERING:  Beatrice Alberts PA-C         OTHER:    DICTATING:  Christo Kim MD            REQ #:[id number]   EXAM:CHW - CT CHEST with CONTRAST      REASON FOR EXAM:  PNEUMONIA      REASON FOR VISIT:  PNEUMONIA            *******Signed******         PROCEDURE:   CT CHEST W/ CONTRAST             COMPARISON:   [facility], CT, CHEST W/ CONTRAST, 2018, 22:54.             INDICATIONS:   PNEUMONIA/ DIAGNOSED WITH BLOOD CLOTS IN LUNGS THIS YEAR/ HX OF     PNEUMONIA AND CHF             TECHNIQUE:   After obtaining the patient's consent, CT images were obtained     with non-ionic       intravenous contrast material.               PROTOCOL:     Pulmonary embolism imaging protocol performed                RADIATION:     DLP: 558mGy*cm          Automated exposure control was utilized to minimize radiation dose.       CONTRAST:   100cc Isovue 370 I.V.      LABS:     eGFR: 558ml/min/1.73m2             FINDINGS:         The pulmonary arteries are well opacified.  There is no evidence of pulmonary     embolus.  There has       been resolution of previously demonstrated emboli.  There is no evidence of     chronic pulmonary       embolus.  Thoracic aorta is of normal caliber.  There is no evidence of aortic     dissection.  There       is no mediastinal, hilar, or axillary adenopathy.  There are no pleural     effusions.  There is       minimal right apical pleural and parenchymal scarring there has been interval     clearing of right       lower lobe airspace consolidation.  Within the lateral aspect of the right     middle lobe there is a 6       mm noncalcified pulmonary nodule.  Within the lateral aspect of the right lower     lobe  anteriorly       image number 49 there is a 1 cm noncalcified nodule.  More posteriorly within     the right lower lobe       there is a subpleural triangular-shaped opacity which may represent an area of     parenchymal scarring       given the prior airspace consolidation in this region.  Calcified granuloma     seen within the left       lower lobe.  Repeat noncontrast CT scan of the chest is recommended in 3 months     document stability       of the above described pulmonary nodules.  There is emphysematous change of the     lungs.  Liver       appears diffusely enlarged as does the spleen but both are incompletely imaged     on this examination       of the chest.  Bilateral adrenal glands are within normal limits there are     degenerative changes       throughout the spine.  No lytic or sclerotic bony lesions are seen.             CONCLUSION:         1. Interval resolution of right pleural effusion and right lower lobe airspace     disease.      2. Interval resolution of multiple pulmonary emboli.  No evidence of acute or     chronic pulmonary       embolus.      3. Noncalcified pulmonary nodules within the right middle lobe and right lower     lobe as detailed       above.  Repeat noncontrast CT scan of the chest is recommended in 3 months to     document stability.      4. Emphysema              ELI JEAN MD             Electronically Signed and Approved By: ELI JEAN MD on 5/10/2018 at 19:16                            Until signed, this is an unconfirmed preliminary report that may contain      errors and is subject to change.                                              STEBA:      D:05/10/18 1917            Assessment      Multiple lung nodules on CT - R91.8            Notes      Discontinued Medications      * (Miconazole 2% Pwd) 30 APL/BOTTLE POWD: 1 APL TOPICAL BID 30 Days #1      * INSULIN LISPRO (HumaLOG VIAL*) 100 UNITS/ML VIAL: UNITS SUBQ QID INSULIN 30     Days #1      * PSYLLIUM SEED  (WITH SUGAR) (Metamucil) 575 GM POWDER: 2 TBSP PO QDAY 30 Days #    1      New Diagnostics      * Chest W/O Cont CT, 6 Months       Dx: Multiple lung nodules on CT - R91.8      IMPRESSION:      1.  Chronic hypoxic respiratory failure now at home on 2 liters of oxygen.      2.  Chronic diastolic heart failure.      3.  COPD, moderately severe with FEV1 of 50% with significant bronchodilator     response.  Alpha 1 antitrypsin testing MM.  Doing much better and COPD     assessment test score is 8 today on bevespi.  The patient has Gold stage C COPD     without frequent exacerbations.      4. Bilateral PEs, resolved.  Finishing six months of Eliquis.        5.  Obesity, BMI 37.      6. Pleural effusion, resolved.      7.  Pneumovax, resolved.              PLAN:      1.  Patient is doing well.      2.  Continue Bevespi two puffs twice a day.      3.  We will check noncontrast chest CT in six months to follow up these nodules     on this patient. These are likely inflammatory nodules secondary to her severe     pneumonia which should resolved.      4. Diet and exercise counseling discussed with the patient today. I spent five     minutes counseling the patient today.  Discussed daily exercise and low fat     diet. She will continue to work on weight loss.      5.  Applauded her for smoking cessation.      6. She is up-to-date with her flu vaccine and Pneumovax as well as Prevnar.      7.  We will have patient follow up in six months for test results.            Patient Education      ACO BMI High above 25:  Counseling Given, Encouraged weight loss, Encourage     dietary changes      Patient Education Provided:  COPD      Other Education:  CT results                 Disclaimer: Converted document may not contain table formatting or lab diagrams. Please see [employer] for the authenticated document.

## 2021-05-28 NOTE — PROGRESS NOTES
Patient: SHAHNAZ SHAIKH     Acct: [id number]     Report: #[id number]  UNIT #: [id number]     : 1945    Encounter Date:2021  PRIMARY CARE: ELENA PIERSON  ***Signed***  --------------------------------------------------------------------------------------------------------------------  History of Present Illness            Chief Complaint: Pt here for f/u, COPD            Shahnaz Shaikh is presenting for evaluation via Telehealth visit. Verbal consent     obtained before beginning visit.            PAST MEDICAL HISTORY/OVERVIEW OF PATIENT SYMPTOMS            Symptoms: none            Former smoker (2.5 ppd x 50 years/ quit 2003)            Any known Exposure to COVID-19: none            Provider spent 11 minutes with the patient during telehealth visit.            The following staff were present during this visit: Snow Coronado CMA, Lukas Mondragon MD            The patient is a very pleasant 75 year old  female with COPD here for     follow up. She is doing well on her current inhaler regimen.  She gets short of     breath with little activity.  She gets short of breath walking 800-900 feet,     moderate in severity, worse with exertion and relieved with rest.  She denies     any coughing, wheezing, headaches, chest pain, weight loss or hemoptysis. Denies    any nausea, vomiting, fevers, chills, headaches, chest pain or weight loss,     otherwise is doing well.  She is able to perform her ADLs without difficulty.      Denies any swollen glands or lymph nodes of the head and neck.            I have personally reviewed the review of systems, past family, social, surgical     and medical histories and I agree with the findings.              Physical exam deferred due to TeleHealth visit.              I reviewed Dr. Saucedo's last office note.                           Allergies and Medications      Allergies:        Coded Allergies:             NO KNOWN ALLERGIES (Unverified , 21)       Medications    Last Reconciled on 1/20/21 13:00 by JEROME HOANG MD      Tiotropium Br/Olodaterol HCl (Stiolto Respimat Inhal Spray) 4 Gm Mist.inhal      2 PUFFS INH QDAY for 30 Days, #1 INH 5 Refills         Prov: CLAUDIA COULTER         9/1/20       Albuterol/Ipratropium (Duoneb) 3 Ml Ampul.neb      3 ML INH RTQ8H for 30 Days, #90 NEB 3 Refills         Prov: JEROME HOANG         5/7/20       Apixaban (Eliquis) 5 Mg Tablet      5 MG PO BID for 30 Days, #75 TAB         Prov: Kirk Urias         1/16/20       Potassium Chloride (K-Dur*) 20 Meq Tab.er.prt      20 MEQ PO QDAY, #30 TAB 0 Refills         Reported         1/12/20       Ergocalciferol (Vitamin D2) 50,000 Unit Capsule      24352 UNITS PO MO@09, #4 CAP 0 Refills         Reported         1/12/20       Gabapentin (Neurontin) 300 Mg Capsule      300 MG PO TID, #90 CAP 0 Refills         Reported         11/20/18       Insulin Detemir (Levemir VIAL) 100 Unit/1 Ml Vial      6 UNITS SUBQ BID for 30 Days, VIAL         Prov: Napoleon Ariza         3/12/18       Famotidine (Famotidine) 20 Mg Tablet      20 MG PO BID for 30 Days, #60 TAB         Prov: Napoleon Ariza         3/12/18       Furosemide (Furosemide) 20 Mg Tablet      20 MG PO QDAY for 30 Days, #30 TAB         Prov: Napoleon Ariza         3/12/18            Plan      Orders:  Phone Eval 11-20 mi [id number]      Instructions      * Chronic conditions reviewed and taken in consideration for today's treatment       plan.      * Plan Of Care: ()      * Patient instructed to seek medical attention urgently for new or worsening       symptoms.      * Patient was educated/instructed on their diagnosis, treatment and medications       today.      * Recommend self monitoring. Instructions given.      * Recommend self quarantine for 14 days.      * Recommend self quarantine until without fever for 72 hours without using fever       reducing medications.      * Recommends over the counter medications for symptom management.             IMPRESSION:      1. History of  massive PE, status post TPA, on lifelong anticoagulation with     Eliquis.      2. Chronic hypoxemic respiratory failure on 2 liters of oxygen.      3. No morbid obesity.      4. Moderately severe COPD without exacerbation. Doing well on Stiolto, no sign     of ground glass nodules and multiple lung nodules now resolving versus stable.            PLAN:      1. Continue lifelong anticoagulation.      2. Continue Stiolto with albuterol as needed.      3. Up-to-date with flu, Prevnar and Pneumovax.      4. She does not need anymore CAT scans as everything shows stable lung nodules     as of 01/2020.      5. Follow up with us annually.              PLAN:      1.            Electronically signed by JEROME HOANG  01/25/2021 05:39       Disclaimer: Converted document may not contain table formatting or lab diagrams. Please see [employer] for the authenticated document.

## 2021-05-28 NOTE — PROGRESS NOTES
Patient: ALLAN CHRISTIE     Acct: [id number]     Report: #[id number]  UNIT #: [id number]     : 1945    Encounter Date:2018  PRIMARY CARE: ELENA PIERSON  ***Signed***  --------------------------------------------------------------------------------------------------------------------  Chief Complaint      Encounter Date      2018            Primary Care Provider      ELENA PIERSON            Referring Provider      ELENA PIERSON            Patient Complaint      Patient is complaining of      f/u copd            VITALS      Height 5 ft 2.00 in / 157.48 cm      Weight 221 lbs  / 100.73862 kg      BSA 1.99 m2      BMI 40.4 kg/m2      Temperature 98.0 F / 36.67 C - Oral      Pulse 84      Respirations 14      Blood Pressure 157/62 Sitting, Left Arm      Pulse Oximetry 94%            HPI      The patient is a very pleasant 73 year old  female with a history of     pulmonary embolism, respiratory failure, pneumonia and pneumothorax here for     follow up.  She has been doing well.  She had a CT done at [facility], I do not have the disk, but report shows a 2 cm ground glass nodule     which apparently is new, but no change in size in other multiple pulmonary     nodules.  She has mildly severe COPD with an FEV1 of 50% with significant     bronchodilator response here for follow up. She completed anticoagulation.  She     is on bevespi with albuterol 1-2 times a day and is here today stating she is at    her baseline. She gets short of breath with walking about 2000 feet or going up     three flights of steps, mild in severity, worse with exertion and relieved with     rest. She denies any recent coughing, wheezing, chest pain or hemoptysis.      Denies any nausea, vomiting, fevers, chills or headaches.  She has not had a     cigarette in 11 months.  She is able to perform her ADLs without difficulty.      Denies any swollen glands or lymph nodes of the head and  neck.            I have personally reviewed the review of systems, past family, social, surgical     and medical histories and I agree with the findings.            ROS      Constitutional:  Denies: Fatigue, Fever, Weight gain, Weight loss, Chills, I    nsomnia, Other      Respiratory/Breathing:  Complains of: Cough; Denies: Shortness of air, Wheezing,    Hemoptysis, Pleuritic pain, Other      Endocrine:  Denies: Polydipsia, Polyuria, Heat/cold intolerance, Abnorml     menstrual pattern, Diabetes, Other      Eyes:  Denies: Blurred vision, Vision Changes, Other      Ears, nose, mouth, throat:  Denies: Congestion, Dysphagia, Hearing Changes, Nose    Bleeding, Nasal Discharge, Throat pain, Tinnitus, Other      Cardiovascular:  Complains of: Exertional dyspnea; Denies: Chest Pain,     Peripheral Edema, Palpitations, Syncope, Wake up Gasping for air, Orthopnea,     Tachycardia, Other      Gastrointestinal:  Denies: Abdominal pain/cramping, Bloody stools, Constipation,    Diarrhea, Melena, Nausea, Vomiting, Other      Genitourinary:  Denies: Dysuria, Urinary frequency, Incontinence, Hematuria,     Urgency, Other      Musculoskeletal:  Denies: Joint Pain, Joint Stiffness, Joint Swelling, Myalgias,    Other      Hematologic/lymphatic:  DENIES: Lymphadenopathy, Bruising, Bleeding tendencies,     Other      Neurologic:  Denies: Headache, Numbness, Weakness, Seizures, Other      Psychiatric:  Denies: Anxiety, Appropriate Effect, Depression, Other      Sleep:  No: Excessive daytime sleep, Morning Headache?, Snoring, Insomnia?, Stop    breathing at sleep?, Other      Integumentary:  Denies: Rash, Dry skin, Skin Warm to Touch, Other            FAMILY/SOCIAL/MEDICAL HX      Stroke - Family Hx:  Child      Heart - Family Hx:  Brother, Child      Is Father Still Living?:  No      Is Mother Still Living?:  No       Family History:  Yes      Social History:  No Tobacco Use, No Alcohol Use, No Recreational Drug use      Smoking status:   Former smoker (2.5 ppd x 50 years/ quit Jan 2003)      Anticoagulation Therapy:  Yes      Antibiotic Prophylaxis:  No      Medical History:  Yes: Asthma, Chronic Bronchitis/COPD, Congestive Heart Failu,     Diabetes; No: Arthritis, Blood Disease, Chemotherapy/Cancer, Seizures, Sinus     Trouble      Psychiatric History      none            PREVENTION      Hx Influenza Vaccination:  No      Influenza Vaccine Declined:  Yes      2 or More Falls Past Year?:  No      Fall Past Year with Injury?:  No      Hx Pneumococcal Vaccination:  No      Encouraged to follow-up with:  PCP regarding preventative exams.      Chart initiated by      carla lagunas/ ma            ALLERGIES/MEDICATIONS      Allergies:        Coded Allergies:             NO KNOWN ALLERGIES (Unverified , 11/20/18)      Medications    Last Reconciled on 11/20/18 11:18 by JEROME HOANG MD      Glycopyrrolate/Formoterol Fum (Bevespi Aerosphere Inhaler) 10.7 Gm Hfa.aer.ad      2 PUFFS INH BID, #1 HFA.AER.AD 4 Refills         Prov: JEROME HOANG         11/20/18       Albuterol/Ipratropium (Duoneb) 3 Ml Ampul.neb      3 ML INH RTQ8H for 30 Days, #90 NEB         Prov: JEROME HOANG         11/20/18       Gabapentin (Neurontin) 300 Mg Capsule      300 MG PO TID, #90 CAP 0 Refills         Reported         11/20/18       Insulin Detemir (Levemir VIAL*) 100 Unit/1 Ml Vial      6 UNITS SUBQ BID for 30 Days, VIAL         Prov: Napoleon Ariza         3/12/18       Famotidine (Famotidine) 20 Mg Tablet      20 MG PO BID for 30 Days, #60 TAB         Prov: Napoleon Ariza         3/12/18       Furosemide (Furosemide) 20 Mg Tablet      20 MG PO QDAY for 30 Days, #30 TAB         Prov: Napoleon Ariza         3/12/18      Current Medications      Current Medications Reviewed 11/20/18            EXAM      Vital Signs Reviewed.      General:  WDWN, Alert, NAD.      HEENT: PERRL, EOMI.  OP, nares clear, no sinus tenderness.      Chest: Good aeration, clear to auscultation bilaterally,  tympanic to percussion     bilaterally, no work of breathing noted.      CV: RRR, no MGR, pulses 2+, equal.        Abd: Obese, soft, NT, ND, +BS, no HSM.      EXT: No clubbing, no cyanosis, no edema.        Neuro:  A  Skin: No rashes or lesions      Vitals      Vitals:             Height 5 ft 2.00 in / 157.48 cm           Weight 221 lbs  / 100.17459 kg           BSA 1.99 m2           BMI 40.4 kg/m2           Temperature 98.0 F / 36.67 C - Oral           Pulse 84           Respirations 14           Blood Pressure 157/62 Sitting, Left Arm           Pulse Oximetry 94%            REVIEW      Results Reviewed      PCCS Results Reviewed?:  Yes Prev Lab Results, Yes Prev Radiology Results, Yes     Previous [facility] Records      Lab Results      I personally reviewed my last office note.  I also reviewed a chest CT report     from [facility] showing a 2 cm ground glass nodule which appears     to be new, but stable nodules otherwise compared to 05/2018 imaging.            Assessment      Multiple lung nodules on CT - R91.8            Notes      New Medications      * Gabapentin (Neurontin) 300 MG CAPSULE: 300 MG PO TID #90      Renewed Medications      * Albuterol/Ipratropium (Duoneb) 3 ML AMPUL.NEB: 3 ML INH RTQ8H 30 Days #90         Instructions: DIAGNOSIS CODE REQUIRED PRIOR TO PRESCRIBING.      * Glycopyrrolate/Formoterol Fum (Bevespi Aerosphere Inhaler) 10.7 GM HFA.AER.AD:      2 PUFFS INH BID #1         Dx: Acute respiratory failure with hypoxia and hypercapnia - J96.01, J96.02      Discontinued Medications      * Apixaban (Eliquis) 5 MG TABLET: 5 MG PO BID 30 Days #60      New Diagnostics      * Chest W/O Cont CT, Year         Dx: Multiple lung nodules on CT - R91.8      IMPRESSION:      1.  Chronic hypoxic respiratory failure on 2 liters of oxygen.      2. Chronic compensated diastolic heart failure.      3. Moderately severe COPD with FEV1 of 50% with significant bronchodilator     response.  Alpha 1  genotype MM.  COPD assessment score is 5 today on bevespi.      Gold stage COPD without exacerbations.      4.  Bilateral PEs, resolved, completed six months of anticoagulation.      5. Morbid obesity with BMI of 40.4.      6. New ground glass nodule of the lung.      7.  Multiple lung nodules, stable x6 months.               PLAN:      1.  The patient overall is doing well.        2.  Continue bevespi two puffs twice day.      3.  We will check noncontrast chest CT in one year.      4. I will get the CT image from [facility] and compare it to a     previous one to assess this ground glass nodule.  It sounds like it is     infectious given some associated atelectasis, however, I would need to take a     look to decide this.      5.  Diet and exercise counseling provided today.  I spent 4 minutes discussing     30 minutes of daily exercise and a 2 gram sodium restriction and 2 liter fluid     restriction per heart failure diet.  She verbalized understanding.        6.  Up-to-date with flu, Prevnar and Pneumovax.      7. Follow up with me in one year.            Patient Education      ACO BMI High above 25:  Counseling Given, Encouraged weight loss, Encourage     dietary changes      Patient Education Provided:  COPD      Other Education:  CT results                 Disclaimer: Converted document may not contain table formatting or lab diagrams. Please see [employer] for the authenticated document.

## 2021-07-20 NOTE — PATIENT INSTRUCTIONS
Chronic Obstructive Pulmonary Disease Exacerbation    Chronic obstructive pulmonary disease (COPD) is a long-term (chronic) condition that affects the lungs. COPD is a general term that can be used to describe many different lung problems that cause lung swelling (inflammation) and limit airflow, including chronic bronchitis and emphysema. COPD exacerbations are episodes when breathing symptoms become much worse and require extra treatment.  COPD exacerbations are usually caused by infections. Without treatment, COPD exacerbations can be severe and even life threatening. Frequent COPD exacerbations can cause further damage to the lungs.  What are the causes?  This condition may be caused by:  · Respiratory infections, including viral and bacterial infections.  · Exposure to smoke.  · Exposure to air pollution, chemical fumes, or dust.  · Things that give you an allergic reaction (allergens).  · Not taking your usual COPD medicines as directed.  · Underlying medical problems, such as congestive heart failure or infections not involving the lungs.  In many cases, the cause (trigger) of this condition is not known.  What increases the risk?  The following factors may make you more likely to develop this condition:  · Smoking cigarettes.  · Old age.  · Frequent prior COPD exacerbations.  What are the signs or symptoms?  Symptoms of this condition include:  · Increased coughing.  · Increased production of mucus from your lungs (sputum).  · Increased wheezing.  · Increased shortness of breath.  · Rapid or labored breathing.  · Chest tightness.  · Less energy than usual.  · Sleep disruption from symptoms.  · Confusion or increased sleepiness.  Often these symptoms happen or get worse even with the use of medicines.  How is this diagnosed?  This condition is diagnosed based on:  · Your medical history.  · A physical exam.  You may also have tests, including:  · A chest X-ray.  · Blood tests.  · Lung (pulmonary) function  tests.  How is this treated?  Treatment for this condition depends on the severity and cause of the symptoms. You may need to be admitted to a hospital for treatment. Some of the treatments commonly used to treat COPD exacerbations are:  · Antibiotic medicines. These may be used for severe exacerbations caused by a lung infection, such as pneumonia.  · Bronchodilators. These are inhaled medicines that expand the air passages and allow increased airflow.  · Steroid medicines. These act to reduce inflammation in the airways. They may be given with an inhaler, taken by mouth, or given through an IV tube inserted into one of your veins.  · Supplemental oxygen therapy.  · Airway clearing techniques, such as noninvasive ventilation (NIV) and positive expiratory pressure (PEP). These provide respiratory support through a mask or other noninvasive device. An example of this would be using a continuous positive airway pressure (CPAP) machine to improve delivery of oxygen into your lungs.  Follow these instructions at home:  Medicines  · Take over-the-counter and prescription medicines only as told by your health care provider. It is important to use correct technique with inhaled medicines.  · If you were prescribed an antibiotic medicine or oral steroid, take it as told by your health care provider. Do not stop taking the medicine even if you start to feel better.  Lifestyle  · Eat a healthy diet.  · Exercise regularly.  · Get plenty of sleep.  · Avoid exposure to all substances that irritate the airway, especially to tobacco smoke.  · Wash your hands often with soap and water to reduce the risk of infection. If soap and water are not available, use hand .  · During flu season, avoid enclosed spaces that are crowded with people.  General instructions  · Drink enough fluid to keep your urine clear or pale yellow (unless you have a medical condition that requires fluid restriction).  · Use a cool mist vaporizer. This  humidifies the air and makes it easier for you to clear your chest when you cough.  · If you have a home nebulizer and oxygen, continue to use them as told by your health care provider.  · Keep all follow-up visits as told by your health care provider. This is important.  How is this prevented?  · Stay up-to-date on pneumococcal and influenza (flu) vaccines. A flu shot is recommended every year to help prevent exacerbations.  · Do not use any products that contain nicotine or tobacco, such as cigarettes and e-cigarettes. Quitting smoking is very important in preventing COPD from getting worse and in preventing exacerbations from happening as often. If you need help quitting, ask your health care provider.  · Follow all instructions for pulmonary rehabilitation after a recent exacerbation. This can help prevent future exacerbations.  · Work with your health care provider to develop and follow an action plan. This tells you what steps to take when you experience certain symptoms.  Contact a health care provider if:  · You have a worsening of your regular COPD symptoms.  Get help right away if:  · You have worsening shortness of breath, even when resting.  · You have trouble talking.  · You have severe chest pain.  · You cough up blood.  · You have a fever.  · You have weakness, vomit repeatedly, or faint.  · You feel confused.  · You are not able to sleep because of your symptoms.  · You have trouble doing daily activities.  Summary  · COPD exacerbations are episodes when breathing symptoms become much worse and require extra treatment above your normal treatment.  · Exacerbations can be severe and even life threatening. Frequent COPD exacerbations can cause further damage to your lungs.  · COPD exacerbations are usually triggered by infections such as the flu, colds, and even pneumonia.  · Treatment for this condition depends on the severity and cause of the symptoms. You may need to be admitted to a hospital for  treatment.  · Quitting smoking is very important to prevent COPD from getting worse and to prevent exacerbations from happening as often.  This information is not intended to replace advice given to you by your health care provider. Make sure you discuss any questions you have with your health care provider.  Document Revised: 11/30/2018 Document Reviewed: 01/22/2018  [email or web address] Patient Education © 2021 [employer]

## 2021-07-20 NOTE — PROGRESS NOTES
Primary Care Provider  Kisha Zheng APRN     Referring Provider  No ref. provider found     Chief Complaint  Follow-up, COPD, Cough, Shortness of Breath, and Wheezing    Subjective            You have chosen to receive care through a telephone visit. Do you consent to use a telephone visit for your medical care today? Yes    History of Presenting Illness  Patient is a 75-year-old  female, patient Dr. Mondragon's with COPD who presents for telehealth follow-up visit today.  Patient states that since last visit her breathing is at baseline.  Patient states that she will get short of breath that is worse with exertion, moderate severity, and improved with rest.  Patient denies wheezing, fever, chills, night sweats, swollen glands in the head and neck, hemoptysis, purulent sputum production, dysphagia, chest pain, chest tightness, abdominal pain, nausea, vomiting, and diarrhea.  Patient also denies any myalgias, changes in sense of taste and/or smell, sore throat, any other coronavirus flulike symptoms.  Patient denies any leg swelling, orthopnea, paroxysmal nocturnal dyspnea.  Patient states that she is taking Stiolto every day as prescribed use albuterol inhaler and duo nebs as needed.  Patient states that she is on continuous oxygen anywhere from 2 to 3-1/2 L/min via nasal cannula.  Patient states she has to increase her oxygen if she is out in hot weather.  Patient states she is not had to take any antibiotics or steroids since last visit and denies any hospitalizations since last visit.  Patient is able to perform her activities of daily living without difficulty.          Review of Systems   Constitutional: Negative for activity change, appetite change, chills, diaphoresis, fatigue, fever, unexpected weight gain and unexpected weight loss.        Negative for Insomnia   HENT: Negative for congestion (Nasal), mouth sores, nosebleeds, postnasal drip, sore throat, swollen glands and trouble  swallowing.         Negative for Thrush  Negative for Hoarseness  Negative for Allergies/Hay Fever  Negative for Recent Head injury  Negative for Ear Fullness  Negative for Nasal or Sinus pain  Negative for Dry lips  Negative for Nasal discharge   Respiratory: Positive for shortness of breath. Negative for apnea, cough, chest tightness and wheezing.         Negative for Hemoptysis  Negative for Pleuritic pain   Cardiovascular: Negative for chest pain, palpitations and leg swelling.        Negative for Claudication  Negative for Cyanosis  Negative for Dyspnea on exertion   Gastrointestinal: Negative for abdominal pain, diarrhea, nausea, vomiting and GERD.   Musculoskeletal: Negative for joint swelling and myalgias.        Negative for Joint pain  Negative for Joint stiffness   Skin: Negative for color change, dry skin, pallor and rash.   Neurological: Negative for weakness and headache.   Hematological: Negative for adenopathy. Does not bruise/bleed easily.        Family History   Problem Relation Age of Onset   • Heart failure Brother    • Heart failure Other         Social History     Socioeconomic History   • Marital status:      Spouse name: Not on file   • Number of children: Not on file   • Years of education: Not on file   • Highest education level: Not on file   Tobacco Use   • Smoking status: Former Smoker     Packs/day: 2.00     Years: 30.00     Pack years: 60.00     Types: Cigarettes     Quit date:      Years since quittin.5   • Smokeless tobacco: Never Used   Vaping Use   • Vaping Use: Never used   Substance and Sexual Activity   • Alcohol use: Never   • Drug use: Never   • Sexual activity: Never        Past Medical History:   Diagnosis Date   • Lung nodule    • Pulmonary arterial hypertension (CMS/HCC)           There is no immunization history on file for this patient.    No Known Allergies       Current Outpatient Medications:   •  albuterol sulfate  (90 Base) MCG/ACT inhaler, ,  Disp: , Rfl:   •  Eliquis 5 MG tablet tablet, , Disp: , Rfl:   •  furosemide (LASIX) 20 MG tablet, , Disp: , Rfl:   •  gabapentin (NEURONTIN) 300 MG capsule, Take 300 mg by mouth 4 (Four) Times a Day., Disp: , Rfl:   •  ipratropium-albuterol (DUO-NEB) 0.5-2.5 mg/3 ml nebulizer, , Disp: , Rfl:   •  [employer] Pen Needles 31G X 6 MM misc, , Disp: , Rfl:   •  Magnesium 200 MG tablet, Take  by mouth., Disp: , Rfl:   •  potassium chloride (K-DUR,KLOR-CON) 10 MEQ CR tablet, , Disp: , Rfl:   •  tiotropium bromide-olodaterol (Stiolto Respimat) 2.5-2.5 MCG/ACT aerosol solution inhaler, Inhale Daily., Disp: , Rfl:      Objective     Physical Exam  Physical exam deferred due to telehealth visit.    Vital Signs:   There were no vitals taken for this visit.        Result Review :   I have personally reviewed Dr. Mondragon's last office visit note.         Assessment and Plan      Assessment:  1. History of  massive PE, status post TPA, on lifelong anticoagulation with     Eliquis.      2. Chronic hypoxemic respiratory failure on 2 liters of oxygen.      3. No morbid obesity.      4. Moderately severe COPD without exacerbation. Doing well on Stiolto, no sign     of ground glass nodules and multiple lung nodules now resolving versus stable.        Plan:  1. Continue lifelong anticoagulation.      2. Continue Stiolto with albuterol as needed.      3.  Patient refuses flu and all pneumonia vaccines. Risks of refusing vaccines discussed with the patient. Patient verbalized understanding.   Patient is advised to receive the COVID-19 vaccine and continue to follow CDC recommendations such as social distancing, wearing a mask, and washing hands for at least 20 seconds.  4.  Patient to call the office, 911, or go to the ER with new or worsening symptoms.     5. Follow up with us annually.       You have chosen to receive care through a telephone visit. Do you consent to use a telephone visit for your medical care today? Yes  Provider spent 10  minutes with the patient during telehealth visit.     Follow Up   Return in about 1 year (around 7/21/2022) for with Dr. Mondragon.  Patient was given instructions and counseling regarding her condition or for health maintenance advice. Please see specific information pulled into the AVS if appropriate.

## 2021-07-21 ENCOUNTER — OFFICE VISIT (OUTPATIENT)
Dept: PULMONOLOGY | Facility: CLINIC | Age: 76
End: 2021-07-21

## 2021-07-21 DIAGNOSIS — J96.11 CHRONIC HYPOXEMIC RESPIRATORY FAILURE (HCC): ICD-10-CM

## 2021-07-21 DIAGNOSIS — Z86.711 PERSONAL HISTORY OF PE (PULMONARY EMBOLISM): ICD-10-CM

## 2021-07-21 DIAGNOSIS — J44.9 CHRONIC OBSTRUCTIVE PULMONARY DISEASE, UNSPECIFIED COPD TYPE (HCC): Primary | ICD-10-CM

## 2021-07-21 PROCEDURE — 99441 PR PHYS/QHP TELEPHONE EVALUATION 5-10 MIN: CPT | Performed by: NURSE PRACTITIONER

## 2021-07-21 RX ORDER — PEN NEEDLE, DIABETIC 31 G X1/4"
NEEDLE, DISPOSABLE MISCELLANEOUS
COMMUNITY
Start: 2021-06-03

## 2021-07-21 RX ORDER — IPRATROPIUM BROMIDE AND ALBUTEROL SULFATE 2.5; .5 MG/3ML; MG/3ML
SOLUTION RESPIRATORY (INHALATION)
COMMUNITY
Start: 2021-06-08 | End: 2022-07-12 | Stop reason: SDUPTHER

## 2021-07-21 RX ORDER — OMEPRAZOLE 20 MG/1
CAPSULE, DELAYED RELEASE ORAL
COMMUNITY
Start: 2021-07-01 | End: 2021-07-21

## 2021-07-21 RX ORDER — GABAPENTIN 300 MG/1
300 CAPSULE ORAL 4 TIMES DAILY
COMMUNITY
End: 2022-07-12

## 2021-07-21 RX ORDER — APIXABAN 5 MG/1
TABLET, FILM COATED ORAL
COMMUNITY
Start: 2021-07-20

## 2021-07-21 RX ORDER — FUROSEMIDE 20 MG/1
TABLET ORAL
COMMUNITY
Start: 2021-05-20

## 2021-07-21 RX ORDER — TIOTROPIUM BROMIDE AND OLODATEROL 3.124; 2.736 UG/1; UG/1
2 SPRAY, METERED RESPIRATORY (INHALATION)
Qty: 1 EACH | Refills: 11 | Status: SHIPPED | OUTPATIENT
Start: 2021-07-21 | End: 2021-08-20

## 2021-07-21 RX ORDER — ALBUTEROL SULFATE 90 UG/1
AEROSOL, METERED RESPIRATORY (INHALATION)
COMMUNITY
Start: 2021-07-17 | End: 2022-07-12 | Stop reason: SDUPTHER

## 2021-07-21 RX ORDER — MAGNESIUM 200 MG
TABLET ORAL
COMMUNITY

## 2021-07-21 RX ORDER — POTASSIUM CHLORIDE 750 MG/1
TABLET, EXTENDED RELEASE ORAL
COMMUNITY
Start: 2021-07-07

## 2021-07-21 RX ORDER — TIOTROPIUM BROMIDE AND OLODATEROL 3.124; 2.736 UG/1; UG/1
SPRAY, METERED RESPIRATORY (INHALATION)
COMMUNITY
End: 2021-07-21 | Stop reason: SDUPTHER

## 2021-07-21 RX ORDER — ROSUVASTATIN CALCIUM 10 MG/1
TABLET, COATED ORAL
COMMUNITY
Start: 2021-05-21 | End: 2021-07-21

## 2022-07-12 ENCOUNTER — OFFICE VISIT (OUTPATIENT)
Dept: PULMONOLOGY | Facility: CLINIC | Age: 77
End: 2022-07-12

## 2022-07-12 DIAGNOSIS — J44.9 CHRONIC OBSTRUCTIVE PULMONARY DISEASE, UNSPECIFIED COPD TYPE: Primary | ICD-10-CM

## 2022-07-12 DIAGNOSIS — J96.11 CHRONIC HYPOXEMIC RESPIRATORY FAILURE: ICD-10-CM

## 2022-07-12 PROCEDURE — 99442 PR PHYS/QHP TELEPHONE EVALUATION 11-20 MIN: CPT | Performed by: NURSE PRACTITIONER

## 2022-07-12 RX ORDER — ROSUVASTATIN CALCIUM 10 MG/1
TABLET, COATED ORAL
COMMUNITY
Start: 2022-06-28

## 2022-07-12 RX ORDER — GABAPENTIN 100 MG/1
CAPSULE ORAL
COMMUNITY
Start: 2022-06-17

## 2022-07-12 RX ORDER — INSULIN DETEMIR 100 [IU]/ML
INJECTION, SOLUTION SUBCUTANEOUS
COMMUNITY
Start: 2022-05-19

## 2022-07-12 RX ORDER — ALBUTEROL SULFATE 90 UG/1
2 AEROSOL, METERED RESPIRATORY (INHALATION) EVERY 4 HOURS PRN
Qty: 8 G | Refills: 11 | Status: SHIPPED | OUTPATIENT
Start: 2022-07-12

## 2022-07-12 RX ORDER — IPRATROPIUM BROMIDE AND ALBUTEROL SULFATE 2.5; .5 MG/3ML; MG/3ML
3 SOLUTION RESPIRATORY (INHALATION)
Qty: 360 ML | Refills: 11 | Status: SHIPPED | OUTPATIENT
Start: 2022-07-12

## 2022-07-12 NOTE — PROGRESS NOTES
Primary Care Provider  Kisha Zheng APRN   Referring Provider  No ref. provider found    Patient Complaint  Follow-up, COPD, and Shortness of Breath      SUBJECTIVE    History of Presenting Illness  Shahnaz Shaikh is a pleasant 76 y.o. female who presents to [facility] for 1 year follow-up.  Visit today is via telephone.  Patient states she is homebound for the most part she is oxygen dependent.  She also uses a walker/wheelchair.  She lives with her daughter and son-in-law.  States that she has limited transportation and is very taxing for her to come an hour to the office for examination.  Patient states she has not had the vaccinations and is practicing social distancing she does not want to get COVID.  She states she does have some shortness of air with moderate exertion.  But rest and paces her self and easily recovers which she feels short of air with oxygen.  Patient states she continues to be on her albuterol and DuoNeb's at this time as well as Spiriva.  Patient states these medications help with her symptoms.    Denies coughing, wheezing, headaches, chest pain, weight loss or hemoptysis. Denies fevers, chills and night sweats. Shahnaz Shaikh is able to perform ADLs without difficulties and denies any swollen glands/lymph nodes in the head or neck.    I have personally reviewed the review of systems, past family, social, medical and surgical histories; and agree with their findings.    Review of Systems   Constitutional: Negative.  Negative for appetite change, chills, fatigue, fever and unexpected weight change.   Respiratory: Positive for shortness of breath. Negative for cough and wheezing.    Cardiovascular: Negative.  Negative for chest pain, palpitations and leg swelling.   Musculoskeletal: Negative.    Skin: Negative.         Family History   Problem Relation Age of Onset   • Heart failure Brother    • Heart failure Other         Social  History     Socioeconomic History   • Marital status:    Tobacco Use   • Smoking status: Former Smoker     Packs/day: 2.00     Years: 30.00     Pack years: 60.00     Types: Cigarettes     Quit date:      Years since quittin.5   • Smokeless tobacco: Never Used   Vaping Use   • Vaping Use: Never used   Substance and Sexual Activity   • Alcohol use: Never   • Drug use: Never   • Sexual activity: Never        Past Medical History:   Diagnosis Date   • Lung nodule    • Pulmonary arterial hypertension (CMS/HCC)           There is no immunization history on file for this patient.    No Known Allergies       Current Outpatient Medications:   •  albuterol sulfate  (90 Base) MCG/ACT inhaler, Inhale 2 puffs Every 4 (Four) Hours As Needed for Wheezing or Shortness of Air., Disp: 8 g, Rfl: 11  •  Eliquis 5 MG tablet tablet, , Disp: , Rfl:   •  furosemide (LASIX) 20 MG tablet, , Disp: , Rfl:   •  gabapentin (NEURONTIN) 100 MG capsule, , Disp: , Rfl:   •  ipratropium-albuterol (DUO-NEB) 0.5-2.5 mg/3 ml nebulizer, Take 3 mL by nebulization 4 (Four) Times a Day., Disp: 360 mL, Rfl: 11  •  [employer] Pen Needles 31G X 6 MM misc, , Disp: , Rfl:   •  Levemir FlexTouch 100 UNIT/ML injection, , Disp: , Rfl:   •  Magnesium 200 MG tablet, Take  by mouth., Disp: , Rfl:   •  potassium chloride (K-DUR,KLOR-CON) 10 MEQ CR tablet, , Disp: , Rfl:   •  rosuvastatin (CRESTOR) 10 MG tablet, , Disp: , Rfl:   •  tiotropium bromide monohydrate (SPIRIVA RESPIMAT) 2.5 MCG/ACT aerosol solution inhaler, Inhale 2 puffs Daily., Disp: 4 g, Rfl: 11       OBJECTIVE    Vital Signs   There were no vitals taken for this visit.    Physical Exam   Deferred due to telephone visit    Results Review  I have personally reviewed the prior office notes.      ASSESSMENT & PLAN    Patient Active Problem List   Diagnosis   • Chronic obstructive pulmonary disease (HCC)   • Personal history of PE (pulmonary embolism)   • Chronic hypoxemic respiratory  failure (HCC)       Diagnoses and all orders for this visit:    1. Chronic obstructive pulmonary disease, unspecified COPD type (HCC) (Primary)  -     albuterol sulfate  (90 Base) MCG/ACT inhaler; Inhale 2 puffs Every 4 (Four) Hours As Needed for Wheezing or Shortness of Air.  Dispense: 8 g; Refill: 11  -     ipratropium-albuterol (DUO-NEB) 0.5-2.5 mg/3 ml nebulizer; Take 3 mL by nebulization 4 (Four) Times a Day.  Dispense: 360 mL; Refill: 11  -     tiotropium bromide monohydrate (SPIRIVA RESPIMAT) 2.5 MCG/ACT aerosol solution inhaler; Inhale 2 puffs Daily.  Dispense: 4 g; Refill: 11    2. Chronic hypoxemic respiratory failure (HCC)  -     albuterol sulfate  (90 Base) MCG/ACT inhaler; Inhale 2 puffs Every 4 (Four) Hours As Needed for Wheezing or Shortness of Air.  Dispense: 8 g; Refill: 11  -     ipratropium-albuterol (DUO-NEB) 0.5-2.5 mg/3 ml nebulizer; Take 3 mL by nebulization 4 (Four) Times a Day.  Dispense: 360 mL; Refill: 11  -     tiotropium bromide monohydrate (SPIRIVA RESPIMAT) 2.5 MCG/ACT aerosol solution inhaler; Inhale 2 puffs Daily.  Dispense: 4 g; Refill: 11           Plan:  Recommended patient to make a follow-up appointment in 2 to 3 months for in person examination and assessment.  Refills today on patient's medications including albuterol DuoNeb and Spiriva.  Patient instructed to follow-up sooner if needed.    Smoking status: Former  Vaccination status: declines  Medications personally reviewed    Follow Up  Return in about 2 months (around 9/26/2022).    Patient was given instructions and counseling regarding her condition or for health maintenance advice. Please see specific information pulled into the AVS if appropriate.      I spent 20 minutes caring for Shahnaz Shaikh on this date of service. This time includes time spent by me in the following activities:preparing for the visit, reviewing tests, obtaining and/or reviewing a separately obtained history, counseling and educating  the patient/family/caregiver, ordering medications, tests, or procedures, documenting information in the medical record, independently interpreting results and communicating that information with the patient/family/caregiver and answered questions family members, discuss medications.

## 2022-09-08 ENCOUNTER — TELEPHONE (OUTPATIENT)
Dept: PULMONOLOGY | Facility: CLINIC | Age: 77
End: 2022-09-08

## 2022-09-15 ENCOUNTER — OFFICE VISIT (OUTPATIENT)
Dept: PULMONOLOGY | Facility: CLINIC | Age: 77
End: 2022-09-15

## 2022-09-15 VITALS
SYSTOLIC BLOOD PRESSURE: 152 MMHG | DIASTOLIC BLOOD PRESSURE: 74 MMHG | OXYGEN SATURATION: 94 % | TEMPERATURE: 97.7 F | HEIGHT: 62 IN | RESPIRATION RATE: 18 BRPM | BODY MASS INDEX: 42.07 KG/M2 | WEIGHT: 228.6 LBS | HEART RATE: 98 BPM

## 2022-09-15 DIAGNOSIS — Z86.711 PERSONAL HISTORY OF PE (PULMONARY EMBOLISM): ICD-10-CM

## 2022-09-15 DIAGNOSIS — J44.9 CHRONIC OBSTRUCTIVE PULMONARY DISEASE, UNSPECIFIED COPD TYPE: Primary | ICD-10-CM

## 2022-09-15 DIAGNOSIS — J96.11 CHRONIC HYPOXEMIC RESPIRATORY FAILURE: ICD-10-CM

## 2022-09-15 PROCEDURE — 99213 OFFICE O/P EST LOW 20 MIN: CPT | Performed by: NURSE PRACTITIONER

## 2022-09-15 NOTE — PROGRESS NOTES
Primary Care Provider  Kisha Zheng APRN   Referring Provider  No ref. provider found    Patient Complaint  COPD, Shortness of Breath, Asthma, Wheezing, Cough, and Follow-up (2 month follow up)      SUBJECTIVE    History of Presenting Illness  Shahnaz Shaikh is a pleasant 77 y.o. female who presents to [facility] for 2-month follow-up on COPD.  Patient's last visit was a telehealth visit.    Denies dyspnea, coughing, wheezing, headaches, chest pain, weight loss or hemoptysis. Denies fevers, chills and night sweats. Shahnaz Shaikh is able to perform ADLs without difficulties and denies any swollen glands/lymph nodes in the head or neck.    I have personally reviewed the review of systems, past family, social, medical and surgical histories; and agree with their findings.    Review of Systems   Constitutional: Negative.  Negative for chills, diaphoresis, fatigue, fever and unexpected weight change.   HENT: Negative.    Eyes: Negative.    Respiratory: Positive for cough and shortness of breath. Negative for wheezing.    Cardiovascular: Negative.  Negative for chest pain, palpitations and leg swelling.   Musculoskeletal: Negative.         Family History   Problem Relation Age of Onset   • Heart failure Brother    • Heart failure Other         Social History     Socioeconomic History   • Marital status:    Tobacco Use   • Smoking status: Former Smoker     Packs/day: 2.00     Years: 30.00     Pack years: 60.00     Types: Cigarettes     Quit date:      Years since quittin.7   • Smokeless tobacco: Never Used   Vaping Use   • Vaping Use: Never used   Substance and Sexual Activity   • Alcohol use: Never   • Drug use: Never   • Sexual activity: Never        Past Medical History:   Diagnosis Date   • Lung nodule    • Pulmonary arterial hypertension (HCC)           There is no immunization history on file for this patient.    No Known Allergies  "      Current Outpatient Medications:   •  albuterol sulfate  (90 Base) MCG/ACT inhaler, Inhale 2 puffs Every 4 (Four) Hours As Needed for Wheezing or Shortness of Air., Disp: 8 g, Rfl: 11  •  Eliquis 5 MG tablet tablet, , Disp: , Rfl:   •  furosemide (LASIX) 20 MG tablet, , Disp: , Rfl:   •  gabapentin (NEURONTIN) 100 MG capsule, , Disp: , Rfl:   •  ipratropium-albuterol (DUO-NEB) 0.5-2.5 mg/3 ml nebulizer, Take 3 mL by nebulization 4 (Four) Times a Day., Disp: 360 mL, Rfl: 11  •  [employer] Pen Needles 31G X 6 MM misc, , Disp: , Rfl:   •  Levemir FlexTouch 100 UNIT/ML injection, , Disp: , Rfl:   •  Magnesium 200 MG tablet, Take  by mouth., Disp: , Rfl:   •  potassium chloride (K-DUR,KLOR-CON) 10 MEQ CR tablet, , Disp: , Rfl:   •  rosuvastatin (CRESTOR) 10 MG tablet, , Disp: , Rfl:   •  tiotropium bromide monohydrate (SPIRIVA RESPIMAT) 2.5 MCG/ACT aerosol solution inhaler, Inhale 2 puffs Daily., Disp: 4 g, Rfl: 11       OBJECTIVE    Vital Signs   /74 (BP Location: Left arm, Patient Position: Sitting, Cuff Size: Adult)   Pulse 98   Temp 97.7 °F (36.5 °C) (Tympanic)   Resp 18   Ht 157.5 cm (62\")   Wt 104 kg (228 lb 9.6 oz)   SpO2 94% Comment: nasal cannula/ 3 liters/ continous  BMI 41.81 kg/m²     Physical Exam  Vitals reviewed.   Constitutional:       General: She is not in acute distress.     Appearance: Normal appearance. She is obese. She is not ill-appearing.   HENT:      Head: Normocephalic and atraumatic.      Nose: Nose normal.      Mouth/Throat:      Mouth: Mucous membranes are moist.      Pharynx: Oropharynx is clear.   Eyes:      Extraocular Movements: Extraocular movements intact.      Conjunctiva/sclera: Conjunctivae normal.      Pupils: Pupils are equal, round, and reactive to light.   Cardiovascular:      Rate and Rhythm: Normal rate and regular rhythm.      Pulses: Normal pulses.      Heart sounds: Normal heart sounds.   Pulmonary:      Effort: Pulmonary effort is normal. No " respiratory distress.      Breath sounds: Normal breath sounds. No stridor. No wheezing, rhonchi or rales.   Abdominal:      General: Bowel sounds are normal.   Musculoskeletal:         General: Normal range of motion.      Cervical back: Normal range of motion and neck supple.      Right lower leg: Edema present.      Left lower leg: Edema present.   Lymphadenopathy:      Cervical: No cervical adenopathy.   Skin:     General: Skin is warm and dry.   Neurological:      General: No focal deficit present.      Mental Status: She is oriented to person, place, and time.   Psychiatric:         Mood and Affect: Mood normal.         Behavior: Behavior normal.          Results Review  I have personally reviewed the prior office notes, diagnostic imaging from 2020.    ASSESSMENT & PLAN    Patient Active Problem List   Diagnosis   • Chronic obstructive pulmonary disease (HCC)   • Personal history of PE (pulmonary embolism)   • Chronic hypoxemic respiratory failure (HCC)       Diagnoses and all orders for this visit:    1. Chronic obstructive pulmonary disease, unspecified COPD type (HCC) (Primary)    2. Chronic hypoxemic respiratory failure (HCC)    3. Personal history of PE (pulmonary embolism)           Plan:  Patient to continue with lifelong anticoagulation with Eliquis due to history of massive PE.   Patient to continue with oxygen at 2 L.  Patient to continue with Stiolto and albuterol.      Smoking status:former  Vaccination status:declines  Medications personally reviewed    Follow Up  Return in about 1 year (around 9/15/2023) for with Dr. Gutierrez.    Patient was given instructions and counseling regarding her condition or for health maintenance advice. Please see specific information pulled into the AVS if appropriate.